# Patient Record
Sex: MALE | Race: WHITE | Employment: FULL TIME | URBAN - METROPOLITAN AREA
[De-identification: names, ages, dates, MRNs, and addresses within clinical notes are randomized per-mention and may not be internally consistent; named-entity substitution may affect disease eponyms.]

---

## 2019-10-10 ENCOUNTER — HOSPITAL ENCOUNTER (EMERGENCY)
Facility: CLINIC | Age: 24
Discharge: HOME | End: 2019-10-10
Attending: NURSE PRACTITIONER | Admitting: NURSE PRACTITIONER
Payer: OTHER MISCELLANEOUS

## 2019-10-10 VITALS
SYSTOLIC BLOOD PRESSURE: 145 MMHG | DIASTOLIC BLOOD PRESSURE: 87 MMHG | HEART RATE: 75 BPM | WEIGHT: 190 LBS | OXYGEN SATURATION: 98 % | TEMPERATURE: 98 F | RESPIRATION RATE: 16 BRPM

## 2019-10-10 DIAGNOSIS — S01.01XA SCALP LACERATION, INITIAL ENCOUNTER: ICD-10-CM

## 2019-10-10 DIAGNOSIS — S09.90XA HEAD INJURY, INITIAL ENCOUNTER: ICD-10-CM

## 2019-10-10 PROCEDURE — 12002 RPR S/N/AX/GEN/TRNK2.6-7.5CM: CPT | Performed by: NURSE PRACTITIONER

## 2019-10-10 PROCEDURE — G0463 HOSPITAL OUTPT CLINIC VISIT: HCPCS | Mod: 25 | Performed by: NURSE PRACTITIONER

## 2019-10-10 PROCEDURE — 99203 OFFICE O/P NEW LOW 30 MIN: CPT | Mod: 25 | Performed by: NURSE PRACTITIONER

## 2019-10-10 PROCEDURE — 90715 TDAP VACCINE 7 YRS/> IM: CPT | Performed by: NURSE PRACTITIONER

## 2019-10-10 PROCEDURE — 90471 IMMUNIZATION ADMIN: CPT | Performed by: NURSE PRACTITIONER

## 2019-10-10 PROCEDURE — 12002 RPR S/N/AX/GEN/TRNK2.6-7.5CM: CPT | Mod: Z6 | Performed by: NURSE PRACTITIONER

## 2019-10-10 PROCEDURE — 25000128 H RX IP 250 OP 636: Performed by: NURSE PRACTITIONER

## 2019-10-10 RX ADMIN — CLOSTRIDIUM TETANI TOXOID ANTIGEN (FORMALDEHYDE INACTIVATED), CORYNEBACTERIUM DIPHTHERIAE TOXOID ANTIGEN (FORMALDEHYDE INACTIVATED), BORDETELLA PERTUSSIS TOXOID ANTIGEN (GLUTARALDEHYDE INACTIVATED), BORDETELLA PERTUSSIS FILAMENTOUS HEMAGGLUTININ ANTIGEN (FORMALDEHYDE INACTIVATED), BORDETELLA PERTUSSIS PERTACTIN ANTIGEN, AND BORDETELLA PERTUSSIS FIMBRIAE 2/3 ANTIGEN 0.5 ML: 5; 2; 2.5; 5; 3; 5 INJECTION, SUSPENSION INTRAMUSCULAR at 12:51

## 2019-10-10 ASSESSMENT — ENCOUNTER SYMPTOMS
BLOOD IN STOOL: 0
SORE THROAT: 0
WOUND: 1
CHILLS: 0
DIAPHORESIS: 0
SHORTNESS OF BREATH: 0
EYE PAIN: 0
ABDOMINAL PAIN: 0
DIFFICULTY URINATING: 0
SPEECH DIFFICULTY: 0
DYSURIA: 0
COUGH: 0
FEVER: 0
HEMATURIA: 0
WHEEZING: 0
NAUSEA: 0
DIARRHEA: 0
VOMITING: 0
MYALGIAS: 0

## 2019-10-10 NOTE — ED AVS SNAPSHOT
[facility] Emergency Department  [address] [address]  Phone:  [phone]  Fax:  [phone]                                    Nino Blair   MRN: [id number]    Department:  [facility] Emergency Department   Date of Visit:  10/10/2019           After Visit Summary Signature Page    I have received my discharge instructions, and my questions have been answered. I have discussed any challenges I see with this plan with the nurse or doctor.    ..........................................................................................................................................  Patient/Patient Representative Signature      ..........................................................................................................................................  Patient Representative Print Name and Relationship to Patient    ..................................................               ................................................  Date                                   Time    ..........................................................................................................................................  Reviewed by Signature/Title    ...................................................              ..............................................  Date                                               Time          22EPIC Rev 08/18

## 2019-10-10 NOTE — ED PROVIDER NOTES
History     Chief Complaint   Patient presents with     Laceration     hit in back of head by a metal band, no loc, has a lac with bleeding controlled. Work comp     HPI  Nino Blair is a 24 year old male who presents with head injury including scalp laceration.  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  Patient denies any loss of consciousness.  Patient reports immediate dizziness that has subsequently resolved.  Patient denies loss of hearing.  Patient denies vision changes, vomiting, diarrhea, mental confusion, speech changes.  Patient reports mild tenderness at the back of his head.  Patient reports feeling well otherwise and denies any concerns.    Allergies:  No Known Allergies    Problem List:    There are no active problems to display for this patient.       Past Medical History:    History reviewed. No pertinent past medical history.    Past Surgical History:    No past surgical history on file.    Family History:    No family history on file.    Social History:  Marital Status:  Single [1]  Social History     Tobacco Use     Smoking status: None   Substance Use Topics     Alcohol use: None     Drug use: None        Medications:    No current outpatient medications on file.        Review of Systems   Constitutional: Negative for chills, diaphoresis and fever.   HENT: Negative for ear pain and sore throat.         Head injury to scalp     Eyes: Negative for pain.   Respiratory: Negative for cough, shortness of breath and wheezing.    Cardiovascular: Negative for chest pain.   Gastrointestinal: Negative for abdominal pain, blood in stool, diarrhea, nausea and vomiting.   Genitourinary: Negative for difficulty urinating, dysuria and hematuria.   Musculoskeletal: Negative for myalgias.   Skin: Positive for wound (laceration to scalp). Negative for rash.   Neurological: Negative for speech  difficulty.   Psychiatric/Behavioral: Negative for self-injury.   All other systems reviewed and are negative.      Physical Exam   BP: (!) 145/87  Pulse: 75  Temp: 98  F (36.7  C)  Resp: 16  Weight: 86.2 kg (190 lb)  SpO2: 98 %      Physical Exam  Vitals signs and nursing note reviewed.   Constitutional:       General: He is not in acute distress.     Appearance: He is well-developed. He is not diaphoretic.   HENT:      Head: Normocephalic. Laceration present. No raccoon eyes, Arellano's sign, right periorbital erythema or left periorbital erythema.      Jaw: There is normal jaw occlusion.        Right Ear: Hearing and external ear normal.      Left Ear: Hearing and external ear normal.      Nose: Nose normal.   Eyes:      General: No scleral icterus.        Right eye: No discharge.         Left eye: No discharge.      Extraocular Movements: Extraocular movements intact.      Conjunctiva/sclera: Conjunctivae normal.      Pupils: Pupils are equal, round, and reactive to light.   Cardiovascular:      Rate and Rhythm: Normal rate and regular rhythm.      Heart sounds: Normal heart sounds. No murmur. No friction rub. No gallop.    Pulmonary:      Effort: Pulmonary effort is normal. No respiratory distress.      Breath sounds: Normal breath sounds. No stridor. No wheezing or rales.   Musculoskeletal:         General: No tenderness.   Skin:     General: Skin is warm.      Capillary Refill: Capillary refill takes less than 2 seconds.      Findings: No rash.   Neurological:      Mental Status: He is alert and oriented to person, place, and time.      Cranial Nerves: Cranial nerves are intact. No cranial nerve deficit or facial asymmetry.      Sensory: Sensation is intact.      Motor: Motor function is intact.      Coordination: Coordination is intact.      Gait: Gait is intact.   Psychiatric:         Behavior: Behavior is cooperative.         ED Course        Procedures  [facility] Procedure Note          Laceration  "Repair:      Laceration repair  Performed by: ALYSSIA Linda CNP  Authorized by: ALYSSIA Linda CNP  Consent: Verbal consent obtained.  Risks and benefits: risks, benefits and alternatives were discussed  Patient understanding: patient states understanding of the procedure being performed  Site marked: the operative site was identified  Time out: Immediately prior to procedure a \"time out\" was called to verify the correct patient, procedure, equipment, support staff and site/side marked as required.    Preparation: Patient was prepped and draped in usual sterile fashion.  Irrigation solution: saline    Body area:scalp  Laceration length: 3cm by 4 mm depth  Contamination: The wound is not contaminated.  Foreign bodies:none  Tendon involvement:none  Anesthesia: Localinfiltration  Local anesthetic: Lidocaine     1% withoutepinephrine and without buffer.  Anesthetic total: 5ml    Debridement: saline and hibiclens  Skin closure:A total of 3  Staples  Technique: interrupted  Approximation: close  Approximation difficulty: simple      Patient tolerance: Patient tolerated the procedure well with no immediate complications.    No results found for this or any previous visit (from the past 24 hour(s)).    Medications   Tdap (tetanus-diphtheria-acell pertussis) (ADACEL) injection 0.5 mL (0.5 mLs Intramuscular Given 10/10/19 1251)       Assessments & Plan (with Medical Decision Making)     I have reviewed the nursing notes.    I have reviewed the findings, diagnosis, plan and need for follow up with the patient.  Nino Blair is a 24 year old male who presents with head injury including scalp laceration.  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  Patient denies any loss of consciousness.  Patient reports immediate dizziness that has subsequently resolved.  Patient denies loss of hearing.  Patient denies " vision changes, vomiting, diarrhea, mental confusion, speech changes.  Patient reports mild tenderness at the back of his head.  On examination patient has a 3 cm laceration that is simple in nature without bony involvement or step-offs.   Patient is neurologically intact and cranial nerves are intact and gait is steady.  Reviewed findings with patient.  3 staples placed without complication.  Discussed head injury care concussion care and wound care with patient tetanus was updated today recommend follow-up in 8 to 10 days for staple removal.  Patient verbalized understanding.  Patient discharged in stable condition.  There are no discharge medications for this patient.      Final diagnoses:   Scalp laceration, initial encounter   Head injury, initial encounter       10/10/2019   [facility] EMERGENCY DEPARTMENT     Julia Parker, APRN CNP  10/10/19 7642